# Patient Record
Sex: MALE | Race: WHITE | Employment: STUDENT | URBAN - METROPOLITAN AREA
[De-identification: names, ages, dates, MRNs, and addresses within clinical notes are randomized per-mention and may not be internally consistent; named-entity substitution may affect disease eponyms.]

---
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
## 2022-12-14 NOTE — ED INITIAL ASSESSMENT (HPI)
Dr Muniz    As per our discussion  Review with patient and follow the plan of getting of Dotatate scan as we discussed    Thanks  x2 days constant ache in right shoulder blade pain, Intermittnet sharp with eating, + radiation to right lower back, Pt also states he is having issues with reflux.   Pt states he has had never been diagnosed with gallbladder issues but believes this is wha

## 2023-01-25 ENCOUNTER — PATIENT OUTREACH (OUTPATIENT)
Dept: CASE MANAGEMENT | Age: 23
End: 2023-01-25

## 2023-01-25 NOTE — PROCEDURES
The office order for PCP request is Approved and completed on January 25, 2023.     Thanks,  [facility] [employer]

## (undated) NOTE — LETTER
Date: 3/14/2018    Patient Name: Miriam Weaver          To Whom it may concern: This letter has been written at the patient's request. The above patient was seen at the [facility] for treatment of a medical condition.     This patient

## (undated) NOTE — LETTER
Date: 11/7/2017    Patient Name: Miriam Weaver          To Whom it may concern: The above patient was seen at the [facility] for treatment of a medical condition.     This patient should be excused from attending work/school from 11/6

## (undated) NOTE — LETTER
ASTHMA ACTION PLAN for Gudelia Thomas     : 2000     Date: 18  Doctor:  Martin Talbert DO  Phone for doctor or clinic: [facility]  [address] [id number] [phone] Nose opens wide  Can't walk  Ribs show  Can't talk well Medicine How much to take When to take it    If your symptoms do not improve in ONE hour -  go to the emergency room or call 911 immediately!  If symptoms improve, call office for appointment immediate